# Patient Record
Sex: FEMALE | Race: WHITE | NOT HISPANIC OR LATINO | Employment: OTHER | URBAN - NONMETROPOLITAN AREA
[De-identification: names, ages, dates, MRNs, and addresses within clinical notes are randomized per-mention and may not be internally consistent; named-entity substitution may affect disease eponyms.]

---

## 2017-01-03 ENCOUNTER — APPOINTMENT (OUTPATIENT)
Dept: NUCLEAR MEDICINE | Facility: HOSPITAL | Age: 67
End: 2017-01-03
Attending: INTERNAL MEDICINE

## 2017-01-03 ENCOUNTER — APPOINTMENT (OUTPATIENT)
Dept: CARDIOLOGY | Facility: HOSPITAL | Age: 67
End: 2017-01-03
Attending: INTERNAL MEDICINE

## 2017-01-09 ENCOUNTER — OFFICE VISIT (OUTPATIENT)
Dept: CARDIOLOGY | Facility: CLINIC | Age: 67
End: 2017-01-09

## 2017-01-09 ENCOUNTER — HOSPITAL ENCOUNTER (OUTPATIENT)
Dept: CARDIOLOGY | Facility: HOSPITAL | Age: 67
Discharge: HOME | End: 2017-01-09
Attending: INTERNAL MEDICINE

## 2017-01-09 ENCOUNTER — HOSPITAL ENCOUNTER (OUTPATIENT)
Dept: NUCLEAR MEDICINE | Facility: HOSPITAL | Age: 67
Discharge: HOME | End: 2017-01-09
Attending: INTERNAL MEDICINE

## 2017-01-09 VITALS
WEIGHT: 182.4 LBS | OXYGEN SATURATION: 99 % | BODY MASS INDEX: 33.57 KG/M2 | DIASTOLIC BLOOD PRESSURE: 72 MMHG | SYSTOLIC BLOOD PRESSURE: 112 MMHG | HEIGHT: 62 IN | HEART RATE: 71 BPM

## 2017-01-09 VITALS — HEIGHT: 62 IN | BODY MASS INDEX: 31.28 KG/M2 | WEIGHT: 170 LBS

## 2017-01-09 DIAGNOSIS — I10 ESSENTIAL HYPERTENSION: ICD-10-CM

## 2017-01-09 DIAGNOSIS — E78.2 MIXED HYPERLIPIDEMIA: ICD-10-CM

## 2017-01-09 DIAGNOSIS — E03.9 ACQUIRED HYPOTHYROIDISM: ICD-10-CM

## 2017-01-09 DIAGNOSIS — I38 VALVULAR HEART DISEASE: Primary | ICD-10-CM

## 2017-01-09 DIAGNOSIS — R07.9 CHEST PAIN IN ADULT: ICD-10-CM

## 2017-01-09 DIAGNOSIS — I35.1 MILD AORTIC INSUFFICIENCY: ICD-10-CM

## 2017-01-09 PROCEDURE — 78452 HT MUSCLE IMAGE SPECT MULT: CPT | Performed by: INTERNAL MEDICINE

## 2017-01-09 PROCEDURE — 93306 TTE W/DOPPLER COMPLETE: CPT | Performed by: INTERNAL MEDICINE

## 2017-01-09 PROCEDURE — 25010000002 REGADENOSON 0.4 MG/5ML SOLUTION: Performed by: INTERNAL MEDICINE

## 2017-01-09 PROCEDURE — 25010000002 AMINOPHYLLINE PER 250 MG: Performed by: INTERNAL MEDICINE

## 2017-01-09 PROCEDURE — 0 TECHNETIUM SESTAMIBI: Performed by: INTERNAL MEDICINE

## 2017-01-09 PROCEDURE — 99214 OFFICE O/P EST MOD 30 MIN: CPT | Performed by: INTERNAL MEDICINE

## 2017-01-09 PROCEDURE — 93306 TTE W/DOPPLER COMPLETE: CPT

## 2017-01-09 PROCEDURE — A9500 TC99M SESTAMIBI: HCPCS | Performed by: INTERNAL MEDICINE

## 2017-01-09 PROCEDURE — 93018 CV STRESS TEST I&R ONLY: CPT | Performed by: INTERNAL MEDICINE

## 2017-01-09 PROCEDURE — 78452 HT MUSCLE IMAGE SPECT MULT: CPT

## 2017-01-09 PROCEDURE — 93017 CV STRESS TEST TRACING ONLY: CPT

## 2017-01-09 RX ORDER — ATENOLOL 25 MG/1
25 TABLET ORAL DAILY
Qty: 90 TABLET | Refills: 0 | Status: CANCELLED | OUTPATIENT
Start: 2017-01-09

## 2017-01-09 RX ORDER — ATENOLOL 25 MG/1
25 TABLET ORAL DAILY PRN
COMMUNITY
End: 2017-01-09 | Stop reason: ALTCHOICE

## 2017-01-09 RX ORDER — AMINOPHYLLINE DIHYDRATE 25 MG/ML
125 INJECTION, SOLUTION INTRAVENOUS ONCE
Status: COMPLETED | OUTPATIENT
Start: 2017-01-09 | End: 2017-01-09

## 2017-01-09 RX ADMIN — Medication 1 DOSE: at 10:16

## 2017-01-09 RX ADMIN — REGADENOSON 0.4 MG: 0.08 INJECTION, SOLUTION INTRAVENOUS at 11:02

## 2017-01-09 RX ADMIN — AMINOPHYLLINE 125 MG: 25 INJECTION, SOLUTION INTRAVENOUS at 11:08

## 2017-01-09 RX ADMIN — Medication 1 DOSE: at 12:33

## 2017-01-09 NOTE — MR AVS SNAPSHOT
Meera MARINA Hull   1/9/2017 4:15 PM   Office Visit    Dept Phone:  [phone]   Encounter #:  [id number]    Provider:  Karel Vizcarra MD   Department:  [facility]                Your Full Care Plan              Today's Medication Changes          These changes are accurate as of: 1/9/17  4:47 PM.  If you have any questions, ask your nurse or doctor.               Stop taking medication(s)listed here:     atenolol 25 MG tablet   Commonly known as:  TENORMIN   Stopped by:  Karel Vizcarra MD                      Your Updated Medication List          This list is accurate as of: 1/9/17  4:47 PM.  Always use your most recent med list.                aspirin 81 MG tablet       atorvastatin 40 MG tablet   Commonly known as:  LIPITOR   Take 1 tablet by mouth daily.       Calcium Carbonate-Vitamin D 600-125 MG-UNIT tablet       CELEXA 40 MG tablet   Generic drug:  citalopram       CO Q-10 PO       ferrous sulfate 325 (65 FE) MG tablet       levothyroxine 50 MCG tablet   Commonly known as:  SYNTHROID, LEVOTHROID       metFORMIN 500 MG tablet   Commonly known as:  GLUCOPHAGE       MULTI-VITAMIN PO       topiramate 100 MG tablet   Commonly known as:  TOPAMAX       vitamin b complex capsule capsule       ZEGERID PO               Instructions     None    Patient Instructions History      Upcoming Appointments     Visit Type Date Time Department    FOLLOW UP 1/9/2017  4:15 PM [facility]    [facility] ECHO 2D COMPLETE VT 1/9/2017  7:15 AM  COR NONINVASIVE LAB     [facility] HOSP NM INJ VT 1/9/2017  7:00 AM [facility] NUC MED    [facility] NM SCAN VT 1/9/2017  8:30 AM [facility] NUC MED    [facility] NM STRESS VT 1/9/2017  9:30 AM  COR STRESS TESTS    [facility] NM SCAN VT 1/9/2017 11:30 AM [facility] NUC MED    FOLLOW UP 4/3/2017  1:00 PM [facility]      Lorrie Signup     Our records indicate that you have declined [facility]  "MyChart signup. If you would like to sign up for [employer], please email [email or web address] or call [phone] to obtain an activation code.             Other Info from Your Visit           Your Appointments     Apr 03, 2017  1:00 PM EDT   Follow Up with Karel Vizcarra MD   [facility] CARDIOLOGY (--)    [address] [place] [address] [place] [address] [phone]           Arrive 15 minutes prior to appointment.              Allergies     Iodinated Diagnostic Agents      Sulfa Antibiotics        Reason for Visit     Palpitations     Hypotension     Rapid Heart Rate           Vital Signs     Blood Pressure Pulse Height Weight Oxygen Saturation Body Mass Index    112/72 (BP Location: Left arm, Patient Position: Sitting) 71 62\" (157.5 cm) 182 lb 6.4 oz (82.7 kg) 99% 33.36 kg/m2    Smoking Status                   Former Smoker             "

## 2017-01-09 NOTE — LETTER
January 11, 2017     MARCY Hoskins  [address]  [place] [address]    Patient: Meera Hull   YOB: 1950   Date of Visit: 1/9/2017       Dear MARCY Pinzon:    Thank you for referring Meera Hull to me for evaluation. Below are the relevant portions of my assessment and plan of care.    If you have questions, please do not hesitate to call me. I look forward to following Meera along with you.         Sincerely,        Karel Vizcarra MD        CC: No Recipients  Karel Vizcarra MD  1/11/2017  1:02 PM  Signed  subjective     Chief Complaint   Patient presents with   • Palpitations   • Hypotension   • Rapid Heart Rate     History of Present Illness  Patient complains of fluctuating blood pressure with periods of tachycardia.  Heartbeat goes up to 120 and then goes down to 40 minute.  Patient has not been able to tolerate medications regularly.  Sometimes blood pressure goes high up to 160 and sometimes is as low as 80 systolic.  Patient had an event monitor, echo and stress test done recently.  She is here for results and further suggestion about the therapy.  This morning during Lexiscan stress test her blood pressure was around 84 systolic prior to a stress test.    Hyperlipidemia  Meera Hull has long-standing history of hyperlipidemia. Has been trying to lose weight and trying to follow diet and activity recommandations. Patient is tolerating medications very well. There has been no side effects. Latest lipid levels have been fluctuating.  LDL is high at 119.    Patient Active Problem List   Diagnosis   • Hypertension , labile*   • Hyperlipidemia*   • Hypothyroidism*   • Obesity*   • Chest pain in adult   • Valvular heart disease ,moderate aortic and mitral regurgitation       Social History   Substance Use Topics   • Smoking status: Former Smoker     Types: Cigarettes     Quit date: 11/2012   • Smokeless tobacco: Never Used   • Alcohol use No       Allergies    "  Allergen Reactions   • Iodinated Diagnostic Agents    • Sulfa Antibiotics          Current Outpatient Prescriptions:   •  aspirin 81 MG tablet, Take 1 tablet by mouth daily., Disp: , Rfl:   •  atorvastatin (LIPITOR) 40 MG tablet, Take 1 tablet by mouth Daily., Disp: 90 tablet, Rfl: 0  •  B Complex Vitamins (VITAMIN B COMPLEX) capsule capsule, Take 1 capsule by mouth daily., Disp: , Rfl:   •  Calcium Carbonate-Vitamin D 600-125 MG-UNIT tablet, Take 1 tablet by mouth 2 (two) times a day., Disp: , Rfl:   •  citalopram (CELEXA) 40 MG tablet, Take 40 mg by mouth daily. Take 1/2 to 1 tablet, Disp: , Rfl:   •  Coenzyme Q10 (CO Q-10 PO), Take 1 tablet by mouth daily., Disp: , Rfl:   •  ferrous sulfate 325 (65 FE) MG tablet, Take 325 mg by mouth daily., Disp: , Rfl:   •  levothyroxine (SYNTHROID, LEVOTHROID) 50 MCG tablet, Take 50 mcg by mouth daily., Disp: , Rfl:   •  metFORMIN (GLUCOPHAGE) 500 MG tablet, Take 2 tablets by mouth as needed. Before meals, Disp: , Rfl:   •  Multiple Vitamin (MULTI-VITAMIN PO), Take  by mouth daily., Disp: , Rfl:   •  Omeprazole-Sodium Bicarbonate (ZEGERID PO), Take  by mouth daily., Disp: , Rfl:   •  topiramate (TOPAMAX) 100 MG tablet, Take 100 mg by mouth 2 (two) times a day., Disp: , Rfl:       The following portions of the patient's history were reviewed and updated as appropriate: allergies, current medications, past family history, past medical history, past social history, past surgical history and problem list.    Review of Systems   Constitution: Positive for weakness and malaise/fatigue.   HENT: Negative.    Eyes: Negative.    Cardiovascular: Positive for near-syncope. Negative for chest pain.   Respiratory: Negative.    Hematologic/Lymphatic: Negative.    Musculoskeletal: Negative.    Gastrointestinal: Negative.    Neurological: Positive for dizziness.          Objective:     Visit Vitals   • /72 (BP Location: Left arm, Patient Position: Sitting)   • Pulse 71   • Ht 62\" " (157.5 cm)   • Wt 182 lb 6.4 oz (82.7 kg)   • SpO2 99%   • BMI 33.36 kg/m2     Physical Exam   Constitutional: She appears well-developed and well-nourished.   HENT:   Head: Normocephalic and atraumatic.   Mouth/Throat: Oropharynx is clear and moist.   Eyes: Conjunctivae and EOM are normal. Pupils are equal, round, and reactive to light. No scleral icterus.   Neck: Normal range of motion. Neck supple. No JVD present. No tracheal deviation present. No thyromegaly present.   Cardiovascular: Normal rate, regular rhythm, normal heart sounds and intact distal pulses.  Exam reveals no friction rub.    No murmur heard.  Pulmonary/Chest: Effort normal and breath sounds normal. No respiratory distress. She has no wheezes. She has no rales. She exhibits no tenderness.   Abdominal: Soft. Bowel sounds are normal. She exhibits no distension and no mass. There is no tenderness. There is no rebound and no guarding.   Musculoskeletal: Normal range of motion. She exhibits no edema, tenderness or deformity.   Lymphadenopathy:     She has no cervical adenopathy.   Neurological: She is alert. She has normal reflexes. No cranial nerve deficit. She exhibits normal muscle tone. Coordination normal.   Skin: Skin is warm and dry.   Psychiatric: She has a normal mood and affect. Her behavior is normal. Judgment and thought content normal.         Lab Review  Lab Results   Component Value Date     08/29/2016    K 4.2 08/29/2016     08/29/2016    BUN 13 08/29/2016    CREATININE 0.83 08/29/2016    GLUCOSE 94 08/29/2016    CALCIUM 9.5 08/29/2016    ALT 14 08/29/2016    ALKPHOS 72 08/29/2016    LABIL2 1.2 (L) 08/29/2016     Lab Results   Component Value Date    CKTOTAL 70 08/29/2016     Lab Results   Component Value Date    WBC 8.91 08/29/2016    HGB 12.0 08/29/2016    HCT 38.3 08/29/2016     08/29/2016     No results found for: INR  No results found for: MG  Lab Results   Component Value Date    TSH 2.687 08/29/2016     Lab  Results   Component Value Date    BNP 60 01/09/2015     Lab Results   Component Value Date    CHLPL 145 08/10/2015     Lab Results   Component Value Date    CHOL 184 08/29/2016    TRIG 81 08/29/2016    HDL 49 (L) 08/29/2016    LDLCALC 119 (H) 08/29/2016    VLDL 16.2 08/29/2016    LDLHDL 2.42 08/29/2016         Procedures   Interpretation Summary stress test January 9, 2017  · Left ventricular ejection fraction is hyperdynamic (Calculated EF > 70%).  · Myocardial perfusion imaging indicates a normal myocardial perfusion study with no evidence of ischemia.  · Impressions are consistent with a low risk study.  · Findings consistent with a normal ECG stress test.         Interpretation Summary echo January 9, 2017  · All left ventricular wall segments contract normally.  · Left ventricular function is normal. Estimated EF = 60%.  · Moderate aortic valve regurgitation is present.  · Moderate mitral valve regurgitation is present  · Trace to mild tricuspid valve regurgitation is present. Estimated right ventricular systolic pressure from tricuspid regurgitation is normal (<35 mmHg).  · There is no evidence of pericardial effusion     Event monitor  Showed periods of sinus tachycardia 40/m and sinus tachycardia up 220/m no pause no significant arrhythmia    I personally viewed and interpreted the patient's LAB data         Assessment:      Diagnosis Plan   1. Valvular heart disease ,moderate aortic and mitral regurgitation     2. Essential hypertension     3. Acquired hypothyroidism     4. Mixed hyperlipidemia            Plan:     Patient's chest pain is atypical and probably noncardiac  She does have moderate mitral and tricuspid regurgitation however her LV functions are normal no change in therapy is needed.    Patient's heart rate and blood pressure are quite labile.  She was advised not to take beta blocker therapy because that is causing significant bradycardia.  As long as her blood pressure is 140 systolic or below  she will not take any medications.  She might take lisinopril 2.5-5 mg only on when necessary basis    Patient will take Lipitor for hyperlipidemia and will check lab work next visit.  She will continue current dose of thyroid as per PCP Della Francisco      Return in about 3 months (around 4/9/2017).

## 2017-01-09 NOTE — PROGRESS NOTES
subjective     Chief Complaint   Patient presents with   • Palpitations   • Hypotension   • Rapid Heart Rate     History of Present Illness  Patient complains of fluctuating blood pressure with periods of tachycardia.  Heartbeat goes up to 120 and then goes down to 40 minute.  Patient has not been able to tolerate medications regularly.  Sometimes blood pressure goes high up to 160 and sometimes is as low as 80 systolic.  Patient had an event monitor, echo and stress test done recently.  She is here for results and further suggestion about the therapy.  This morning during Lexiscan stress test her blood pressure was around 84 systolic prior to a stress test.    Hyperlipidemia  Meera Hull has long-standing history of hyperlipidemia. Has been trying to lose weight and trying to follow diet and activity recommandations. Patient is tolerating medications very well. There has been no side effects. Latest lipid levels have been fluctuating.  LDL is high at 119.    Patient Active Problem List   Diagnosis   • Hypertension , labile*   • Hyperlipidemia*   • Hypothyroidism*   • Obesity*   • Chest pain in adult   • Valvular heart disease ,moderate aortic and mitral regurgitation       Social History   Substance Use Topics   • Smoking status: Former Smoker     Types: Cigarettes     Quit date: 11/2012   • Smokeless tobacco: Never Used   • Alcohol use No       Allergies   Allergen Reactions   • Iodinated Diagnostic Agents    • Sulfa Antibiotics          Current Outpatient Prescriptions:   •  aspirin 81 MG tablet, Take 1 tablet by mouth daily., Disp: , Rfl:   •  atorvastatin (LIPITOR) 40 MG tablet, Take 1 tablet by mouth Daily., Disp: 90 tablet, Rfl: 0  •  B Complex Vitamins (VITAMIN B COMPLEX) capsule capsule, Take 1 capsule by mouth daily., Disp: , Rfl:   •  Calcium Carbonate-Vitamin D 600-125 MG-UNIT tablet, Take 1 tablet by mouth 2 (two) times a day., Disp: , Rfl:   •  citalopram (CELEXA) 40 MG tablet, Take 40 mg by mouth  "daily. Take 1/2 to 1 tablet, Disp: , Rfl:   •  Coenzyme Q10 (CO Q-10 PO), Take 1 tablet by mouth daily., Disp: , Rfl:   •  ferrous sulfate 325 (65 FE) MG tablet, Take 325 mg by mouth daily., Disp: , Rfl:   •  levothyroxine (SYNTHROID, LEVOTHROID) 50 MCG tablet, Take 50 mcg by mouth daily., Disp: , Rfl:   •  metFORMIN (GLUCOPHAGE) 500 MG tablet, Take 2 tablets by mouth as needed. Before meals, Disp: , Rfl:   •  Multiple Vitamin (MULTI-VITAMIN PO), Take  by mouth daily., Disp: , Rfl:   •  Omeprazole-Sodium Bicarbonate (ZEGERID PO), Take  by mouth daily., Disp: , Rfl:   •  topiramate (TOPAMAX) 100 MG tablet, Take 100 mg by mouth 2 (two) times a day., Disp: , Rfl:       The following portions of the patient's history were reviewed and updated as appropriate: allergies, current medications, past family history, past medical history, past social history, past surgical history and problem list.    Review of Systems   Constitution: Positive for weakness and malaise/fatigue.   HENT: Negative.    Eyes: Negative.    Cardiovascular: Positive for near-syncope. Negative for chest pain.   Respiratory: Negative.    Hematologic/Lymphatic: Negative.    Musculoskeletal: Negative.    Gastrointestinal: Negative.    Neurological: Positive for dizziness.          Objective:     Visit Vitals   • /72 (BP Location: Left arm, Patient Position: Sitting)   • Pulse 71   • Ht 62\" (157.5 cm)   • Wt 182 lb 6.4 oz (82.7 kg)   • SpO2 99%   • BMI 33.36 kg/m2     Physical Exam   Constitutional: She appears well-developed and well-nourished.   HENT:   Head: Normocephalic and atraumatic.   Mouth/Throat: Oropharynx is clear and moist.   Eyes: Conjunctivae and EOM are normal. Pupils are equal, round, and reactive to light. No scleral icterus.   Neck: Normal range of motion. Neck supple. No JVD present. No tracheal deviation present. No thyromegaly present.   Cardiovascular: Normal rate, regular rhythm, normal heart sounds and intact distal pulses.  " Exam reveals no friction rub.    No murmur heard.  Pulmonary/Chest: Effort normal and breath sounds normal. No respiratory distress. She has no wheezes. She has no rales. She exhibits no tenderness.   Abdominal: Soft. Bowel sounds are normal. She exhibits no distension and no mass. There is no tenderness. There is no rebound and no guarding.   Musculoskeletal: Normal range of motion. She exhibits no edema, tenderness or deformity.   Lymphadenopathy:     She has no cervical adenopathy.   Neurological: She is alert. She has normal reflexes. No cranial nerve deficit. She exhibits normal muscle tone. Coordination normal.   Skin: Skin is warm and dry.   Psychiatric: She has a normal mood and affect. Her behavior is normal. Judgment and thought content normal.         Lab Review  Lab Results   Component Value Date     08/29/2016    K 4.2 08/29/2016     08/29/2016    BUN 13 08/29/2016    CREATININE 0.83 08/29/2016    GLUCOSE 94 08/29/2016    CALCIUM 9.5 08/29/2016    ALT 14 08/29/2016    ALKPHOS 72 08/29/2016    LABIL2 1.2 (L) 08/29/2016     Lab Results   Component Value Date    CKTOTAL 70 08/29/2016     Lab Results   Component Value Date    WBC 8.91 08/29/2016    HGB 12.0 08/29/2016    HCT 38.3 08/29/2016     08/29/2016     No results found for: INR  No results found for: MG  Lab Results   Component Value Date    TSH 2.687 08/29/2016     Lab Results   Component Value Date    BNP 60 01/09/2015     Lab Results   Component Value Date    CHLPL 145 08/10/2015     Lab Results   Component Value Date    CHOL 184 08/29/2016    TRIG 81 08/29/2016    HDL 49 (L) 08/29/2016    LDLCALC 119 (H) 08/29/2016    VLDL 16.2 08/29/2016    LDLHDL 2.42 08/29/2016         Procedures   Interpretation Summary stress test January 9, 2017  · Left ventricular ejection fraction is hyperdynamic (Calculated EF > 70%).  · Myocardial perfusion imaging indicates a normal myocardial perfusion study with no evidence of  ischemia.  · Impressions are consistent with a low risk study.  · Findings consistent with a normal ECG stress test.         Interpretation Summary echo January 9, 2017  · All left ventricular wall segments contract normally.  · Left ventricular function is normal. Estimated EF = 60%.  · Moderate aortic valve regurgitation is present.  · Moderate mitral valve regurgitation is present  · Trace to mild tricuspid valve regurgitation is present. Estimated right ventricular systolic pressure from tricuspid regurgitation is normal (<35 mmHg).  · There is no evidence of pericardial effusion     Event monitor  Showed periods of sinus tachycardia 40/m and sinus tachycardia up 220/m no pause no significant arrhythmia    I personally viewed and interpreted the patient's LAB data         Assessment:      Diagnosis Plan   1. Valvular heart disease ,moderate aortic and mitral regurgitation     2. Essential hypertension     3. Acquired hypothyroidism     4. Mixed hyperlipidemia            Plan:     Patient's chest pain is atypical and probably noncardiac  She does have moderate mitral and tricuspid regurgitation however her LV functions are normal no change in therapy is needed.    Patient's heart rate and blood pressure are quite labile.  She was advised not to take beta blocker therapy because that is causing significant bradycardia.  As long as her blood pressure is 140 systolic or below she will not take any medications.  She might take lisinopril 2.5-5 mg only on when necessary basis    Patient will take Lipitor for hyperlipidemia and will check lab work next visit.  She will continue current dose of thyroid as per PCP Della Francisco      Return in about 3 months (around 4/9/2017).

## 2017-01-10 ENCOUNTER — APPOINTMENT (OUTPATIENT)
Dept: NUCLEAR MEDICINE | Facility: HOSPITAL | Age: 67
End: 2017-01-10
Attending: INTERNAL MEDICINE

## 2017-01-10 ENCOUNTER — APPOINTMENT (OUTPATIENT)
Dept: CARDIOLOGY | Facility: HOSPITAL | Age: 67
End: 2017-01-10
Attending: INTERNAL MEDICINE

## 2017-01-10 ENCOUNTER — TELEPHONE (OUTPATIENT)
Dept: CARDIOLOGY | Facility: CLINIC | Age: 67
End: 2017-01-10

## 2017-01-10 LAB
BH CV ECHO MEAS - ACS: 1.1 CM
BH CV ECHO MEAS - AI DEC SLOPE: 175.2 CM/SEC^2
BH CV ECHO MEAS - AI MAX PG: 71.2 MMHG
BH CV ECHO MEAS - AI MAX VEL: 422 CM/SEC
BH CV ECHO MEAS - AI P1/2T: 705.3 MSEC
BH CV ECHO MEAS - AO MAX PG (FULL): 6.6 MMHG
BH CV ECHO MEAS - AO MAX PG: 11.4 MMHG
BH CV ECHO MEAS - AO MEAN PG (FULL): 3.8 MMHG
BH CV ECHO MEAS - AO MEAN PG: 6.2 MMHG
BH CV ECHO MEAS - AO ROOT AREA (BSA CORRECTED): 1.6
BH CV ECHO MEAS - AO ROOT AREA: 5.3 CM^2
BH CV ECHO MEAS - AO ROOT DIAM: 2.6 CM
BH CV ECHO MEAS - AO V2 MAX: 168.6 CM/SEC
BH CV ECHO MEAS - AO V2 MEAN: 117.2 CM/SEC
BH CV ECHO MEAS - AO V2 VTI: 44.6 CM
BH CV ECHO MEAS - AVA(I,A): 1.9 CM^2
BH CV ECHO MEAS - AVA(I,D): 1.9 CM^2
BH CV ECHO MEAS - AVA(V,A): 2.1 CM^2
BH CV ECHO MEAS - AVA(V,D): 2.1 CM^2
BH CV ECHO MEAS - BSA(HAYCOCK): 1.7 M^2
BH CV ECHO MEAS - BSA: 1.6 M^2
BH CV ECHO MEAS - BZI_BMI: 25.6 KILOGRAMS/M^2
BH CV ECHO MEAS - BZI_METRIC_HEIGHT: 157.5 CM
BH CV ECHO MEAS - BZI_METRIC_WEIGHT: 63.5 KG
BH CV ECHO MEAS - CONTRAST EF 4CH: 59.7 ML/M^2
BH CV ECHO MEAS - EDV(CUBED): 205.7 ML
BH CV ECHO MEAS - EDV(MOD-SP4): 124 ML
BH CV ECHO MEAS - EDV(TEICH): 173.4 ML
BH CV ECHO MEAS - EF(CUBED): 63.6 %
BH CV ECHO MEAS - EF(MOD-SP4): 59.7 %
BH CV ECHO MEAS - EF(TEICH): 54.3 %
BH CV ECHO MEAS - ESV(CUBED): 75 ML
BH CV ECHO MEAS - ESV(MOD-SP4): 50 ML
BH CV ECHO MEAS - ESV(TEICH): 79.3 ML
BH CV ECHO MEAS - FS: 28.6 %
BH CV ECHO MEAS - IVS/LVPW: 0.59
BH CV ECHO MEAS - IVSD: 0.48 CM
BH CV ECHO MEAS - LA DIMENSION: 3.1 CM
BH CV ECHO MEAS - LA/AO: 1.2
BH CV ECHO MEAS - LV DIASTOLIC VOL/BSA (35-75): 75.5 ML/M^2
BH CV ECHO MEAS - LV MASS(C)D: 138.2 GRAMS
BH CV ECHO MEAS - LV MASS(C)DI: 84.2 GRAMS/M^2
BH CV ECHO MEAS - LV MAX PG: 4.8 MMHG
BH CV ECHO MEAS - LV MEAN PG: 2.4 MMHG
BH CV ECHO MEAS - LV SYSTOLIC VOL/BSA (12-30): 30.4 ML/M^2
BH CV ECHO MEAS - LV V1 MAX: 109.6 CM/SEC
BH CV ECHO MEAS - LV V1 MEAN: 70.7 CM/SEC
BH CV ECHO MEAS - LV V1 VTI: 26.6 CM
BH CV ECHO MEAS - LVIDD: 5.9 CM
BH CV ECHO MEAS - LVIDS: 4.2 CM
BH CV ECHO MEAS - LVLD AP4: 7.8 CM
BH CV ECHO MEAS - LVLS AP4: 6.8 CM
BH CV ECHO MEAS - LVOT AREA (M): 3.1 CM^2
BH CV ECHO MEAS - LVOT AREA: 3.2 CM^2
BH CV ECHO MEAS - LVOT DIAM: 2 CM
BH CV ECHO MEAS - LVPWD: 0.81 CM
BH CV ECHO MEAS - MV A MAX VEL: 75 CM/SEC
BH CV ECHO MEAS - MV DEC TIME: 0.26 SEC
BH CV ECHO MEAS - MV E MAX VEL: 110.1 CM/SEC
BH CV ECHO MEAS - MV E/A: 1.5
BH CV ECHO MEAS - PA ACC SLOPE: 334.9 CM/SEC^2
BH CV ECHO MEAS - PA ACC TIME: 0.19 SEC
BH CV ECHO MEAS - PA PR(ACCEL): -6.6 MMHG
BH CV ECHO MEAS - RAP SYSTOLE: 10 MMHG
BH CV ECHO MEAS - RVSP: 30.9 MMHG
BH CV ECHO MEAS - SI(AO): 144.4 ML/M^2
BH CV ECHO MEAS - SI(CUBED): 79.6 ML/M^2
BH CV ECHO MEAS - SI(LVOT): 51.6 ML/M^2
BH CV ECHO MEAS - SI(MOD-SP4): 45 ML/M^2
BH CV ECHO MEAS - SI(TEICH): 57.3 ML/M^2
BH CV ECHO MEAS - SV(AO): 237.2 ML
BH CV ECHO MEAS - SV(CUBED): 130.7 ML
BH CV ECHO MEAS - SV(LVOT): 84.7 ML
BH CV ECHO MEAS - SV(MOD-SP4): 74 ML
BH CV ECHO MEAS - SV(TEICH): 94.1 ML
BH CV ECHO MEAS - TR MAX VEL: 228.6 CM/SEC
BH CV NUCLEAR PRIOR STUDY: 3
BH CV STRESS BP STAGE 1: NORMAL
BH CV STRESS COMMENTS STAGE 1: NORMAL
BH CV STRESS DOSE REGADENOSON STAGE 1: 0.4
BH CV STRESS DURATION MIN STAGE 1: 0
BH CV STRESS DURATION SEC STAGE 1: 15
BH CV STRESS HR STAGE 1: 99
BH CV STRESS PROTOCOL 1: NORMAL
BH CV STRESS RECOVERY BP: NORMAL MMHG
BH CV STRESS RECOVERY HR: 61 BPM
BH CV STRESS STAGE 1: 1
LV EF 2D ECHO EST: 60 %
LV EF NUC BP: 76 %
MAXIMAL PREDICTED HEART RATE: 156 BPM
PERCENT MAX PREDICTED HR: 63.46 %
STRESS BASELINE BP: NORMAL MMHG
STRESS BASELINE HR: 59 BPM
STRESS PERCENT HR: 75 %
STRESS POST PEAK BP: NORMAL MMHG
STRESS POST PEAK HR: 99 BPM
STRESS TARGET HR: 133 BPM

## 2017-01-10 RX ORDER — ATORVASTATIN CALCIUM 40 MG/1
40 TABLET, FILM COATED ORAL DAILY
Qty: 90 TABLET | Refills: 0 | Status: SHIPPED | OUTPATIENT
Start: 2017-01-10 | End: 2017-04-03 | Stop reason: SDUPTHER

## 2017-01-10 NOTE — TELEPHONE ENCOUNTER
Stress test is normal   Echo shows lead EKG mitral and aortic valve.  No change.  No sign of heart failure.   No need to change the medications

## 2017-01-11 PROBLEM — I38 VALVULAR HEART DISEASE: Status: ACTIVE | Noted: 2017-01-11

## 2017-04-03 ENCOUNTER — OFFICE VISIT (OUTPATIENT)
Dept: CARDIOLOGY | Facility: CLINIC | Age: 67
End: 2017-04-03

## 2017-04-03 VITALS
BODY MASS INDEX: 33.16 KG/M2 | SYSTOLIC BLOOD PRESSURE: 102 MMHG | HEART RATE: 82 BPM | WEIGHT: 180.2 LBS | HEIGHT: 62 IN | DIASTOLIC BLOOD PRESSURE: 68 MMHG | OXYGEN SATURATION: 97 %

## 2017-04-03 DIAGNOSIS — I10 ESSENTIAL HYPERTENSION: ICD-10-CM

## 2017-04-03 DIAGNOSIS — R07.9 CHEST PAIN IN ADULT: ICD-10-CM

## 2017-04-03 DIAGNOSIS — E03.9 ACQUIRED HYPOTHYROIDISM: ICD-10-CM

## 2017-04-03 DIAGNOSIS — I38 VALVULAR HEART DISEASE: Primary | ICD-10-CM

## 2017-04-03 DIAGNOSIS — E78.2 MIXED HYPERLIPIDEMIA: ICD-10-CM

## 2017-04-03 PROCEDURE — 99213 OFFICE O/P EST LOW 20 MIN: CPT | Performed by: INTERNAL MEDICINE

## 2017-04-03 RX ORDER — ATORVASTATIN CALCIUM 40 MG/1
40 TABLET, FILM COATED ORAL DAILY
Qty: 30 TABLET | Refills: 2 | Status: SHIPPED | OUTPATIENT
Start: 2017-04-03 | End: 2019-10-14 | Stop reason: SDUPTHER

## 2017-04-03 NOTE — PROGRESS NOTES
subjective     Chief Complaint   Patient presents with   • Follow-up   • Hyperlipidemia     History of Present Illness      HYPERTENSION  Meera Hull has long-standing history of mild labile hypertension.  Patient is not requiring any medications.  According to her blood pressure lately has been doing fairly well.  She is not requiring any medicines at this time.    Hyperlipidemia  Meera Hull has long-standing history of hyperlipidemia.  Has been trying to lose weight and trying to follow diet and activity recommandations.  Patient is tolerating medications very well.  There has been no side effects.    Patient is taking Lipitor 40 mg daily.  She will try to lose weight and continue current medications    Valvular heart disease  Patient has moderate aortic and mitral regurgitation but is asymptomatic at this time.  LV functions are normal to nearly there is no evidence of heart failure.    Her other problems consist of hypothyroidism and obesity and fatigue    Patient Active Problem List   Diagnosis   • Hypertension , labile*   • Hyperlipidemia*   • Hypothyroidism*   • Obesity*   • Chest pain in adult   • Valvular heart disease ,moderate aortic and mitral regurgitation       Social History   Substance Use Topics   • Smoking status: Former Smoker     Types: Cigarettes     Quit date: 11/2012   • Smokeless tobacco: Never Used   • Alcohol use No       Allergies   Allergen Reactions   • Iodinated Diagnostic Agents    • Sulfa Antibiotics          Current Outpatient Prescriptions:   •  aspirin 81 MG tablet, Take 1 tablet by mouth daily., Disp: , Rfl:   •  atorvastatin (LIPITOR) 40 MG tablet, Take 1 tablet by mouth Daily., Disp: 30 tablet, Rfl: 2  •  B Complex Vitamins (VITAMIN B COMPLEX) capsule capsule, Take 1 capsule by mouth daily., Disp: , Rfl:   •  Calcium Carbonate-Vitamin D 600-125 MG-UNIT tablet, Take 1 tablet by mouth 2 (two) times a day., Disp: , Rfl:   •  citalopram (CELEXA) 40 MG tablet, Take 40 mg by  "mouth daily. Take 1/2 to 1 tablet, Disp: , Rfl:   •  Coenzyme Q10 (CO Q-10 PO), Take 1 tablet by mouth daily., Disp: , Rfl:   •  ferrous sulfate 325 (65 FE) MG tablet, Take 325 mg by mouth daily., Disp: , Rfl:   •  levothyroxine (SYNTHROID, LEVOTHROID) 50 MCG tablet, Take 50 mcg by mouth daily., Disp: , Rfl:   •  metFORMIN (GLUCOPHAGE) 500 MG tablet, Take 2 tablets by mouth as needed. Before meals, Disp: , Rfl:   •  Multiple Vitamin (MULTI-VITAMIN PO), Take  by mouth daily., Disp: , Rfl:   •  Omeprazole-Sodium Bicarbonate (ZEGERID PO), Take  by mouth daily., Disp: , Rfl:   •  topiramate (TOPAMAX) 100 MG tablet, Take 100 mg by mouth 2 (two) times a day., Disp: , Rfl:       The following portions of the patient's history were reviewed and updated as appropriate: allergies, current medications, past family history, past medical history, past social history, past surgical history and problem list.    Review of Systems   Constitution: Negative.   HENT: Negative.    Eyes: Negative.    Cardiovascular: Negative.    Respiratory: Negative.    Hematologic/Lymphatic: Negative.    Musculoskeletal: Negative.    Gastrointestinal: Negative.    Neurological: Negative.           Objective:     /68 (BP Location: Left arm, Patient Position: Sitting)  Pulse 82  Ht 62\" (157.5 cm)  Wt 180 lb 3.2 oz (81.7 kg)  SpO2 97%  BMI 32.96 kg/m2  Physical Exam   Constitutional: She appears well-developed and well-nourished.   HENT:   Head: Normocephalic and atraumatic.   Mouth/Throat: Oropharynx is clear and moist.   Eyes: Conjunctivae and EOM are normal. Pupils are equal, round, and reactive to light. No scleral icterus.   Neck: Normal range of motion. Neck supple. No JVD present. No tracheal deviation present. No thyromegaly present.   Cardiovascular: Normal rate, regular rhythm, normal heart sounds and intact distal pulses.  Exam reveals no friction rub.    No murmur heard.  Pulmonary/Chest: Effort normal and breath sounds normal. No " respiratory distress. She has no wheezes. She has no rales. She exhibits no tenderness.   Abdominal: Soft. Bowel sounds are normal. She exhibits no distension and no mass. There is no tenderness. There is no rebound and no guarding.   Musculoskeletal: Normal range of motion. She exhibits no edema, tenderness or deformity.   Lymphadenopathy:     She has no cervical adenopathy.   Neurological: She is alert. She has normal reflexes. No cranial nerve deficit. She exhibits normal muscle tone. Coordination normal.   Skin: Skin is warm and dry.   Psychiatric: She has a normal mood and affect. Her behavior is normal. Judgment and thought content normal.         Lab Review  Lab from [facility] through lab core 3/17/2017    hemoglobin A1c 5.9,   total cholesterol 182,   HDL 60,   ,   triglyceride 88   CBC and comprehensive metabolic panel and TSH  Normal.    Procedures     I personally viewed and interpreted the patient's LAB data         Assessment:     1. Valvular heart disease ,moderate aortic and mitral regurgitation    2. Essential hypertension    3. Mixed hyperlipidemia    4. Acquired hypothyroidism    5. Chest pain in adult          Plan:      patient has moderate aortic and mitral regurgitation.  There is no evidence of heart failure patient is asymptomatic  Her blood pressure has been fairly well controlled without medications.  She is trying to avoid salt    Lipids are better she will continue Lipitor 40 mg daily LDL is still mildly elevated.  No change in therapy was made from cardiac standpoint          Return in about 3 months (around 7/3/2017).

## 2017-07-28 ENCOUNTER — OFFICE VISIT (OUTPATIENT)
Dept: CARDIOLOGY | Facility: CLINIC | Age: 67
End: 2017-07-28

## 2017-07-28 VITALS
SYSTOLIC BLOOD PRESSURE: 102 MMHG | DIASTOLIC BLOOD PRESSURE: 68 MMHG | HEART RATE: 70 BPM | WEIGHT: 184.4 LBS | OXYGEN SATURATION: 98 % | HEIGHT: 62 IN | BODY MASS INDEX: 33.93 KG/M2

## 2017-07-28 DIAGNOSIS — R00.2 PALPITATION: Primary | ICD-10-CM

## 2017-07-28 DIAGNOSIS — I38 VALVULAR HEART DISEASE: ICD-10-CM

## 2017-07-28 DIAGNOSIS — E78.2 MIXED HYPERLIPIDEMIA: ICD-10-CM

## 2017-07-28 PROCEDURE — 99213 OFFICE O/P EST LOW 20 MIN: CPT | Performed by: INTERNAL MEDICINE

## 2017-12-12 ENCOUNTER — TRANSCRIBE ORDERS (OUTPATIENT)
Dept: ADMINISTRATIVE | Facility: HOSPITAL | Age: 67
End: 2017-12-12

## 2017-12-12 DIAGNOSIS — Z12.31 VISIT FOR SCREENING MAMMOGRAM: Primary | ICD-10-CM

## 2018-01-12 ENCOUNTER — HOSPITAL ENCOUNTER (OUTPATIENT)
Dept: MAMMOGRAPHY | Facility: HOSPITAL | Age: 68
Discharge: HOME | End: 2018-01-12
Admitting: NURSE PRACTITIONER

## 2018-01-12 DIAGNOSIS — Z12.31 VISIT FOR SCREENING MAMMOGRAM: ICD-10-CM

## 2018-01-12 PROCEDURE — 77067 SCR MAMMO BI INCL CAD: CPT

## 2018-01-12 PROCEDURE — 77063 BREAST TOMOSYNTHESIS BI: CPT | Performed by: RADIOLOGY

## 2018-01-12 PROCEDURE — 77063 BREAST TOMOSYNTHESIS BI: CPT

## 2018-01-12 PROCEDURE — 77067 SCR MAMMO BI INCL CAD: CPT | Performed by: RADIOLOGY

## 2018-01-25 ENCOUNTER — OFFICE VISIT (OUTPATIENT)
Dept: CARDIOLOGY | Facility: CLINIC | Age: 68
End: 2018-01-25

## 2018-01-25 VITALS
WEIGHT: 193 LBS | SYSTOLIC BLOOD PRESSURE: 100 MMHG | DIASTOLIC BLOOD PRESSURE: 60 MMHG | OXYGEN SATURATION: 97 % | TEMPERATURE: 97.7 F | BODY MASS INDEX: 35.51 KG/M2 | HEIGHT: 62 IN | HEART RATE: 74 BPM

## 2018-01-25 DIAGNOSIS — I10 ESSENTIAL HYPERTENSION: ICD-10-CM

## 2018-01-25 DIAGNOSIS — I38 VALVULAR HEART DISEASE: ICD-10-CM

## 2018-01-25 DIAGNOSIS — E78.2 MIXED HYPERLIPIDEMIA: Primary | ICD-10-CM

## 2018-01-25 PROCEDURE — 99213 OFFICE O/P EST LOW 20 MIN: CPT | Performed by: INTERNAL MEDICINE

## 2018-01-25 RX ORDER — ATORVASTATIN CALCIUM 40 MG/1
40 TABLET, FILM COATED ORAL DAILY
Qty: 90 TABLET | Refills: 0 | Status: CANCELLED | OUTPATIENT
Start: 2018-01-25

## 2018-01-25 RX ORDER — IPRATROPIUM BROMIDE AND ALBUTEROL SULFATE 2.5; .5 MG/3ML; MG/3ML
3 SOLUTION RESPIRATORY (INHALATION) EVERY 4 HOURS PRN
COMMUNITY

## 2018-01-25 NOTE — PROGRESS NOTES
subjective     Chief Complaint   Patient presents with   • Follow-up   • Palpitations   • valvular heart disease     History of Present Illness    Patient is 67 years old white female who is here for cardiology follow-up.  Patient has history of hypertension, hyperlipidemia and the moderate aortic and mitral regurgitation.  She also has history of palpitations.   Patient is doing very well.  She is taking Lipitor 40 mg daily.  There are no drug side effects.  Labile hypertension is also very well controlled.  She is actually not taking any blood pressure medicine at this time.    Lately she has had no palpitations.  She is doing very well and is asymptomatic.  Patient has moderate aortic and mitral regurgitation but no clinical evidence of heart failure.  She is very well compensated      Past Surgical History:   Procedure Laterality Date   • BREAST BIOPSY Right    • COLONOSCOPY W/ BIOPSIES  09/2014    Dr. Valenzuela-benign colonic mucosa w/ no diagnostic histopathologic abnormalities   • HYSTERECTOMY      partial   • TONSILLECTOMY       Family History   Problem Relation Age of Onset   • Heart attack Father    • Stroke Father    • Heart attack Other    • Cancer Mother    • Cancer Sister    • Cancer Sister    • Heart attack Sister    • Hypertension Sister    • Heart block Sister    • Aneurysm Sister    • Breast cancer Neg Hx      Past Medical History:   Diagnosis Date   • History of EKG 01/23/2015    normal except for rate   • Hyperlipidemia    • Hypertension    • Hypothyroidism    • Mild aortic insufficiency    • Obesity      Patient Active Problem List   Diagnosis   • Hypertension , labile*   • Hyperlipidemia*   • Hypothyroidism*   • Obesity*   • Chest pain in adult   • Valvular heart disease ,moderate aortic and mitral regurgitation   • Palpitation   • B12 deficiency       Social History   Substance Use Topics   • Smoking status: Former Smoker     Types: Cigarettes     Quit date: 11/2012   • Smokeless tobacco: Never  Used   • Alcohol use No       Allergies   Allergen Reactions   • Iodinated Diagnostic Agents    • Sulfa Antibiotics        Current Outpatient Prescriptions on File Prior to Visit   Medication Sig   • aspirin 81 MG tablet Take 1 tablet by mouth daily.   • atorvastatin (LIPITOR) 40 MG tablet Take 1 tablet by mouth Daily.   • B Complex Vitamins (VITAMIN B COMPLEX) capsule capsule Take 1 capsule by mouth daily.   • Calcium Carbonate-Vitamin D 600-125 MG-UNIT tablet Take 1 tablet by mouth 2 (two) times a day.   • citalopram (CELEXA) 40 MG tablet Take 40 mg by mouth daily. Take 1/2 to 1 tablet   • Coenzyme Q10 (CO Q-10 PO) Take 1 tablet by mouth daily.   • ferrous sulfate 325 (65 FE) MG tablet Take 325 mg by mouth daily.   • levothyroxine (SYNTHROID, LEVOTHROID) 50 MCG tablet Take 50 mcg by mouth daily.   • metFORMIN (GLUCOPHAGE) 500 MG tablet Take 2 tablets by mouth as needed. Before meals   • Multiple Vitamin (MULTI-VITAMIN PO) Take  by mouth daily.   • Omeprazole-Sodium Bicarbonate (ZEGERID PO) Take  by mouth daily.   • topiramate (TOPAMAX) 100 MG tablet Take 100 mg by mouth 2 (two) times a day.     No current facility-administered medications on file prior to visit.          The following portions of the patient's history were reviewed and updated as appropriate: allergies, current medications, past family history, past medical history, past social history, past surgical history and problem list.    Review of Systems   Constitution: Negative.   HENT: Negative.  Negative for congestion.    Eyes: Negative.    Cardiovascular: Negative.  Negative for chest pain, cyanosis, dyspnea on exertion, irregular heartbeat, leg swelling, near-syncope, orthopnea, palpitations, paroxysmal nocturnal dyspnea and syncope.   Respiratory: Negative.  Negative for shortness of breath.    Hematologic/Lymphatic: Negative.    Musculoskeletal: Negative.    Gastrointestinal: Negative.    Neurological: Negative.  Negative for headaches.         "  Objective:     /60  Pulse 74  Temp 97.7 °F (36.5 °C) (Tympanic)   Ht 157.5 cm (62\")  Wt 87.5 kg (193 lb)  SpO2 97%  BMI 35.3 kg/m2  Physical Exam   Constitutional: She appears well-developed and well-nourished. No distress.   HENT:   Head: Normocephalic and atraumatic.   Mouth/Throat: Oropharynx is clear and moist. No oropharyngeal exudate.   Eyes: Conjunctivae and EOM are normal. Pupils are equal, round, and reactive to light. No scleral icterus.   Neck: Normal range of motion. Neck supple. No JVD present. No tracheal deviation present. No thyromegaly present.   Cardiovascular: Normal rate, regular rhythm and intact distal pulses.  PMI is not displaced.  Exam reveals no gallop, no friction rub and no decreased pulses.    Murmur heard.  Pulses:       Carotid pulses are 3+ on the right side, and 3+ on the left side.       Radial pulses are 3+ on the right side, and 3+ on the left side.   Pulmonary/Chest: Effort normal and breath sounds normal. No respiratory distress. She has no wheezes. She has no rales. She exhibits no tenderness.   Abdominal: Soft. Bowel sounds are normal. She exhibits no distension, no abdominal bruit and no mass. There is no splenomegaly or hepatomegaly. There is no tenderness. There is no rebound and no guarding.   Musculoskeletal: Normal range of motion. She exhibits no edema, tenderness or deformity.   Lymphadenopathy:     She has no cervical adenopathy.   Neurological: She is alert. She has normal reflexes. No cranial nerve deficit. She exhibits normal muscle tone. Coordination normal.   Skin: Skin is warm and dry. No rash noted. She is not diaphoretic. No erythema.   Psychiatric: She has a normal mood and affect. Her behavior is normal. Judgment and thought content normal.         Lab Review  Last lab work 4 months ago showed a cholesterol of 178 and .  Lab work will be scheduled for next visit.    Procedures       I personally viewed and interpreted the patient's LAB " data         Assessment:     1. Mixed hyperlipidemia    2. Essential hypertension    3. Valvular heart disease ,moderate aortic and mitral regurgitation          Plan:      Patient sees be doing very well.  She is tolerating Lipitor 40 mg daily which will be continued.  She also takes aspirin one a day.  Blood pressure is very well controlled without any medications.  Lately she has had no spells of high blood pressure.  Her other problems consist of hypothyroidism and her diabetes mellitus which is controlled.  From cardiac standpoint she is doing very well.  Clinically valvular status is unchanged.  There is no evidence of heart failure.  Follow-up scheduled.        No Follow-up on file.

## 2018-03-06 ENCOUNTER — HOSPITAL ENCOUNTER (OUTPATIENT)
Dept: MAMMOGRAPHY | Facility: HOSPITAL | Age: 68
Discharge: HOME | End: 2018-03-06
Attending: RADIOLOGY | Admitting: RADIOLOGY

## 2018-03-06 ENCOUNTER — HOSPITAL ENCOUNTER (OUTPATIENT)
Dept: ULTRASOUND IMAGING | Facility: HOSPITAL | Age: 68
Discharge: HOME | End: 2018-03-06
Attending: RADIOLOGY

## 2018-03-06 DIAGNOSIS — R92.8 ABNORMAL MAMMOGRAM OF LEFT BREAST: ICD-10-CM

## 2018-03-06 PROCEDURE — 77061 BREAST TOMOSYNTHESIS UNI: CPT | Performed by: RADIOLOGY

## 2018-03-06 PROCEDURE — 77065 DX MAMMO INCL CAD UNI: CPT

## 2018-03-06 PROCEDURE — 77065 DX MAMMO INCL CAD UNI: CPT | Performed by: RADIOLOGY

## 2018-03-06 PROCEDURE — G0279 TOMOSYNTHESIS, MAMMO: HCPCS

## 2018-09-19 ENCOUNTER — OFFICE VISIT (OUTPATIENT)
Dept: CARDIOLOGY | Facility: CLINIC | Age: 68
End: 2018-09-19

## 2018-09-19 VITALS
SYSTOLIC BLOOD PRESSURE: 108 MMHG | WEIGHT: 186 LBS | DIASTOLIC BLOOD PRESSURE: 72 MMHG | HEART RATE: 91 BPM | HEIGHT: 62 IN | OXYGEN SATURATION: 98 % | BODY MASS INDEX: 34.23 KG/M2

## 2018-09-19 DIAGNOSIS — I38 VALVULAR HEART DISEASE: ICD-10-CM

## 2018-09-19 DIAGNOSIS — E78.2 MIXED HYPERLIPIDEMIA: Primary | ICD-10-CM

## 2018-09-19 DIAGNOSIS — R00.2 PALPITATION: ICD-10-CM

## 2018-09-19 PROCEDURE — 99213 OFFICE O/P EST LOW 20 MIN: CPT | Performed by: INTERNAL MEDICINE

## 2018-09-19 PROCEDURE — 93000 ELECTROCARDIOGRAM COMPLETE: CPT | Performed by: INTERNAL MEDICINE

## 2018-09-19 RX ORDER — ATENOLOL 25 MG/1
25 TABLET ORAL DAILY
Qty: 90 TABLET | Refills: 1 | Status: SHIPPED | OUTPATIENT
Start: 2018-09-19 | End: 2019-10-14 | Stop reason: SDUPTHER

## 2018-09-19 RX ORDER — ATENOLOL 25 MG/1
1 TABLET ORAL
COMMUNITY
End: 2018-09-19 | Stop reason: SDUPTHER

## 2018-09-19 NOTE — PROGRESS NOTES
subjective     Chief Complaint   Patient presents with   • Results     Labs   • Hyperlipidemia     Follow up   • Palpitations     Follow up     History of Present Illness  Patient is 68 years old white female who is here for cardiology follow-up.  Patient has history of hyperlipidemia.  She is taking Lipitor 40 mg daily and is trying to follow diet.  There is no drug side effects.    Patient also has history of palpitations which are very well controlled with atenolol 25 mg daily.    Patient has valvular heart disease with moderate aortic regurgitation and mitral regurgitation.  Clinically there is no evidence of heart failure.  Last stress test on January 9, 2017 was normal with normal ejection fraction.  Echocardiogram at that time showed moderate aortic and moderate mitral regurgitation.  There was also trace tricuspid regurgitation.  No evidence of pulmonary hypertension.  Patient is here for cardiology follow-up    Past Surgical History:   Procedure Laterality Date   • BREAST BIOPSY Right    • COLONOSCOPY W/ BIOPSIES  09/2014    Dr. Valenzuela-benign colonic mucosa w/ no diagnostic histopathologic abnormalities   • HYSTERECTOMY      partial   • TONSILLECTOMY       Family History   Problem Relation Age of Onset   • Heart attack Father    • Stroke Father    • Heart attack Other    • Cancer Mother    • Cancer Sister    • Cancer Sister    • Heart attack Sister    • Hypertension Sister    • Heart block Sister    • Aneurysm Sister    • Breast cancer Neg Hx      Past Medical History:   Diagnosis Date   • History of EKG 01/23/2015    normal except for rate   • Hyperlipidemia    • Hypertension    • Hypothyroidism    • Mild aortic insufficiency    • Obesity      Patient Active Problem List   Diagnosis   • Hypertension , labile*   • Hyperlipidemia*   • Hypothyroidism*   • Obesity*   • Chest pain in adult   • Valvular heart disease ,moderate aortic and mitral regurgitation   • Palpitation   • B12 deficiency       Social  History   Substance Use Topics   • Smoking status: Former Smoker     Types: Cigarettes     Quit date: 11/2012   • Smokeless tobacco: Never Used   • Alcohol use No       Allergies   Allergen Reactions   • Iodinated Diagnostic Agents    • Sulfa Antibiotics        Current Outpatient Prescriptions on File Prior to Visit   Medication Sig   • aspirin 81 MG tablet Take 1 tablet by mouth daily.   • atorvastatin (LIPITOR) 40 MG tablet Take 1 tablet by mouth Daily.   • B Complex Vitamins (VITAMIN B COMPLEX) capsule capsule Take 1 capsule by mouth daily.   • Calcium Carbonate-Vitamin D 600-125 MG-UNIT tablet Take 1 tablet by mouth 2 (two) times a day.   • citalopram (CELEXA) 40 MG tablet Take 40 mg by mouth daily. Take 1/2 to 1 tablet   • Coenzyme Q10 (CO Q-10 PO) Take 1 tablet by mouth daily.   • ferrous sulfate 325 (65 FE) MG tablet Take 325 mg by mouth daily.   • ipratropium-albuterol (DUO-NEB) 0.5-2.5 mg/mL nebulizer Take 3 mL by nebulization Every 4 (Four) Hours As Needed for Wheezing.   • levothyroxine (SYNTHROID, LEVOTHROID) 50 MCG tablet Take 50 mcg by mouth daily.   • metFORMIN (GLUCOPHAGE) 500 MG tablet Take 2 tablets by mouth as needed. Before meals   • Multiple Vitamin (MULTI-VITAMIN PO) Take  by mouth daily.   • Omeprazole-Sodium Bicarbonate (ZEGERID PO) Take  by mouth daily.   • topiramate (TOPAMAX) 100 MG tablet Take 100 mg by mouth 2 (two) times a day.     No current facility-administered medications on file prior to visit.          The following portions of the patient's history were reviewed and updated as appropriate: allergies, current medications, past family history, past medical history, past social history, past surgical history and problem list.    Review of Systems   Constitution: Negative.   HENT: Negative.  Negative for congestion.    Eyes: Negative.    Cardiovascular: Negative.  Negative for chest pain, cyanosis, dyspnea on exertion, irregular heartbeat, leg swelling, near-syncope, orthopnea,  "palpitations, paroxysmal nocturnal dyspnea and syncope.   Respiratory: Negative.  Negative for shortness of breath.    Hematologic/Lymphatic: Negative.    Musculoskeletal: Negative.    Gastrointestinal: Negative.    Neurological: Negative.  Negative for headaches.          Objective:     /72 (BP Location: Left arm, Patient Position: Sitting, Cuff Size: Adult)   Pulse 91   Ht 157.5 cm (62\")   Wt 84.4 kg (186 lb)   SpO2 98%   BMI 34.02 kg/m²   Physical Exam   Constitutional: She appears well-developed and well-nourished. No distress.   HENT:   Head: Normocephalic and atraumatic.   Mouth/Throat: Oropharynx is clear and moist. No oropharyngeal exudate.   Eyes: Pupils are equal, round, and reactive to light. Conjunctivae and EOM are normal. No scleral icterus.   Neck: Normal range of motion. Neck supple. No JVD present. No tracheal deviation present. No thyromegaly present.   Cardiovascular: Normal rate, regular rhythm and intact distal pulses.  PMI is not displaced.  Exam reveals no gallop, no friction rub and no decreased pulses.    Murmur heard.  Pulses:       Carotid pulses are 3+ on the right side, and 3+ on the left side.       Radial pulses are 3+ on the right side, and 3+ on the left side.   Patient has diastolic murmur audible at the base and grade/6 pansystolic murmur audible over the pericardium   Pulmonary/Chest: Effort normal and breath sounds normal. No respiratory distress. She has no wheezes. She has no rales. She exhibits no tenderness.   Abdominal: Soft. Bowel sounds are normal. She exhibits no distension, no abdominal bruit and no mass. There is no splenomegaly or hepatomegaly. There is no tenderness. There is no rebound and no guarding.   Musculoskeletal: Normal range of motion. She exhibits no edema, tenderness or deformity.   Lymphadenopathy:     She has no cervical adenopathy.   Neurological: She is alert. She has normal reflexes. No cranial nerve deficit. She exhibits normal muscle " tone. Coordination normal.   Skin: Skin is warm and dry. No rash noted. She is not diaphoretic. No erythema.   Psychiatric: She has a normal mood and affect. Her behavior is normal. Judgment and thought content normal.         Lab Review          ECG 12 Lead  Date/Time: 9/24/2018 9:31 AM  Performed by: BURKE ZULETA  Authorized by: BURKE ZULETA   Comparison: compared with previous ECG from 8/29/2016  Comparison to previous ECG: Bradycardia is no longer noted  Rhythm: sinus rhythm  Rate: normal  BPM: 68  Conduction: conduction normal  ST Segments: ST segments normal  T Waves: T waves normal  QRS axis: normal  Other: no other findings  Clinical impression: normal ECG               I personally viewed and interpreted the patient's LAB data         Assessment:     1. Mixed hyperlipidemia    2. Valvular heart disease ,moderate aortic and mitral regurgitation    3. Palpitation          Plan:      Patient has hyperlipidemia she is taking Lipitor 40 mg daily lab work reviewed  Cholesterol is 181 and .  Goal of 70 was emphasized.  She will continue current medications.  Healthy diet and weight loss was emphasized.  Patient is overweight.    Palpitations are better controlled.  No change in therapy is needed.  Follow-up scheduled      Return in about 6 months (around 3/19/2019).

## 2019-01-28 ENCOUNTER — HOSPITAL ENCOUNTER (OUTPATIENT)
Dept: BONE DENSITY | Facility: HOSPITAL | Age: 69
Discharge: HOME | End: 2019-01-28
Admitting: NURSE PRACTITIONER

## 2019-01-28 DIAGNOSIS — Z78.0 POSTMENOPAUSAL: ICD-10-CM

## 2019-01-28 PROCEDURE — 77080 DXA BONE DENSITY AXIAL: CPT | Performed by: RADIOLOGY

## 2019-01-28 PROCEDURE — 77080 DXA BONE DENSITY AXIAL: CPT

## 2019-03-19 ENCOUNTER — OFFICE VISIT (OUTPATIENT)
Dept: CARDIOLOGY | Facility: CLINIC | Age: 69
End: 2019-03-19

## 2019-03-19 VITALS
HEART RATE: 82 BPM | DIASTOLIC BLOOD PRESSURE: 80 MMHG | WEIGHT: 180 LBS | OXYGEN SATURATION: 95 % | SYSTOLIC BLOOD PRESSURE: 122 MMHG | HEIGHT: 62 IN | BODY MASS INDEX: 33.13 KG/M2

## 2019-03-19 DIAGNOSIS — I38 VALVULAR HEART DISEASE: ICD-10-CM

## 2019-03-19 DIAGNOSIS — R00.2 PALPITATION: ICD-10-CM

## 2019-03-19 DIAGNOSIS — E78.2 MIXED HYPERLIPIDEMIA: Primary | ICD-10-CM

## 2019-03-19 DIAGNOSIS — I10 ESSENTIAL HYPERTENSION: ICD-10-CM

## 2019-03-19 PROBLEM — E11.9 TYPE 2 DIABETES MELLITUS WITHOUT COMPLICATION, WITHOUT LONG-TERM CURRENT USE OF INSULIN (HCC): Status: ACTIVE | Noted: 2019-03-19

## 2019-03-19 PROCEDURE — 99213 OFFICE O/P EST LOW 20 MIN: CPT | Performed by: INTERNAL MEDICINE

## 2019-03-19 NOTE — PROGRESS NOTES
subjective     Chief Complaint   Patient presents with   • Hyperlipidemia   • Hypertension   • Follow-up     History of Present Illness  Patient is 69 years old white female who has history of hyperlipidemia, labile hypertension and moderate aortic regurgitation.  Patient is here for follow-up.  She seems to be doing very well.  She denies any chest pain palpitations or shortness of breath.  Her lab work had shown LDL of 136.  She is taking Lipitor 40 mg daily according to her.    Past Surgical History:   Procedure Laterality Date   • BREAST BIOPSY Right    • COLONOSCOPY W/ BIOPSIES  2014    Dr. Valenzuela-benign colonic mucosa w/ no diagnostic histopathologic abnormalities   • HYSTERECTOMY      partial   • TONSILLECTOMY       Family History   Problem Relation Age of Onset   • Heart attack Father    • Stroke Father    • Heart attack Other    • Cancer Mother    • Cancer Sister    • Cancer Sister    • Heart attack Sister    • Hypertension Sister    • Heart block Sister    • Aneurysm Sister    • Breast cancer Neg Hx      Past Medical History:   Diagnosis Date   • History of EKG 2015    normal except for rate   • Hyperlipidemia    • Hypertension    • Hypothyroidism    • Mild aortic insufficiency    • Obesity      Patient Active Problem List   Diagnosis   • Hypertension , labile*   • Hyperlipidemia*   • Hypothyroidism*   • Obesity*   • Chest pain in adult   • Valvular heart disease ,moderate aortic and mitral regurgitation   • Palpitation   • B12 deficiency   • Type 2 diabetes mellitus without complication, without long-term current use of insulin (CMS/[facility])       Social History     Tobacco Use   • Smoking status: Former Smoker     Types: Cigarettes     Last attempt to quit: 2012     Years since quittin.3   • Smokeless tobacco: Never Used   Substance Use Topics   • Alcohol use: No   • Drug use: No       Allergies   Allergen Reactions   • Iodinated Diagnostic Agents    • Sulfa Antibiotics        Current  Outpatient Medications on File Prior to Visit   Medication Sig   • aspirin 81 MG tablet Take 1 tablet by mouth daily.   • atenolol (TENORMIN) 25 MG tablet Take 1 tablet by mouth Daily.   • atorvastatin (LIPITOR) 40 MG tablet Take 1 tablet by mouth Daily.   • B Complex Vitamins (VITAMIN B COMPLEX) capsule capsule Take 1 capsule by mouth daily.   • Calcium Carbonate-Vitamin D 600-125 MG-UNIT tablet Take 1 tablet by mouth 2 (two) times a day.   • citalopram (CELEXA) 40 MG tablet Take 40 mg by mouth daily. Take 1/2 to 1 tablet   • Coenzyme Q10 (CO Q-10 PO) Take 1 tablet by mouth daily.   • ferrous sulfate 325 (65 FE) MG tablet Take 325 mg by mouth daily.   • ipratropium-albuterol (DUO-NEB) 0.5-2.5 mg/mL nebulizer Take 3 mL by nebulization Every 4 (Four) Hours As Needed for Wheezing.   • levothyroxine (SYNTHROID, LEVOTHROID) 50 MCG tablet Take 50 mcg by mouth daily.   • metFORMIN (GLUCOPHAGE) 500 MG tablet Take 2 tablets by mouth as needed. Before meals   • Multiple Vitamin (MULTI-VITAMIN PO) Take  by mouth daily.   • Omeprazole-Sodium Bicarbonate (ZEGERID PO) Take  by mouth daily.   • topiramate (TOPAMAX) 100 MG tablet Take 100 mg by mouth 2 (two) times a day.     No current facility-administered medications on file prior to visit.          The following portions of the patient's history were reviewed and updated as appropriate: allergies, current medications, past family history, past medical history, past social history, past surgical history and problem list.    Review of Systems   Constitution: Negative.   HENT: Negative.  Negative for congestion.    Eyes: Negative.    Cardiovascular: Negative.  Negative for chest pain, cyanosis, dyspnea on exertion, irregular heartbeat, leg swelling, near-syncope, orthopnea, palpitations, paroxysmal nocturnal dyspnea and syncope.   Respiratory: Negative.  Negative for shortness of breath.    Hematologic/Lymphatic: Negative.    Musculoskeletal: Negative.    Gastrointestinal:  "Negative.    Neurological: Negative.  Negative for headaches.          Objective:     /80 (BP Location: Left arm, Patient Position: Sitting)   Pulse 82   Ht 157.5 cm (62\")   Wt 81.6 kg (180 lb)   SpO2 95%   BMI 32.92 kg/m²   Physical Exam   Constitutional: She appears well-developed and well-nourished. No distress.   HENT:   Head: Normocephalic and atraumatic.   Mouth/Throat: Oropharynx is clear and moist. No oropharyngeal exudate.   Eyes: Conjunctivae and EOM are normal. Pupils are equal, round, and reactive to light. No scleral icterus.   Neck: Normal range of motion. Neck supple. No JVD present. No tracheal deviation present. No thyromegaly present.   Cardiovascular: Normal rate, regular rhythm, normal heart sounds and intact distal pulses. PMI is not displaced. Exam reveals no gallop, no friction rub and no decreased pulses.   No murmur heard.  Pulses:       Carotid pulses are 3+ on the right side, and 3+ on the left side.       Radial pulses are 3+ on the right side, and 3+ on the left side.   Pulmonary/Chest: Effort normal and breath sounds normal. No respiratory distress. She has no wheezes. She has no rales. She exhibits no tenderness.   Abdominal: Soft. Bowel sounds are normal. She exhibits no distension, no abdominal bruit and no mass. There is no splenomegaly or hepatomegaly. There is no tenderness. There is no rebound and no guarding.   Musculoskeletal: Normal range of motion. She exhibits no edema, tenderness or deformity.   Lymphadenopathy:     She has no cervical adenopathy.   Neurological: She is alert. She has normal reflexes. No cranial nerve deficit. She exhibits normal muscle tone. Coordination normal.   Skin: Skin is warm and dry. No rash noted. She is not diaphoretic. No erythema.   Psychiatric: She has a normal mood and affect. Her behavior is normal. Judgment and thought content normal.         Lab Review    Procedures       I personally viewed and interpreted the patient's LAB " data         Assessment:     1. Mixed hyperlipidemia    2. Essential hypertension    3. Valvular heart disease ,moderate aortic and mitral regurgitation    4. Palpitation          Plan:     Lipids are not very well controlled.  LDL is 136.  Patient will continue Lipitor and will have lab work done.  Healthy lifestyle was again emphasized.  Blood pressure is very well controlled.  No change in therapy.  Patient has moderate aortic regurgitation clinically no evidence of heart failure.        No Follow-up on file.

## 2019-10-14 ENCOUNTER — OFFICE VISIT (OUTPATIENT)
Dept: CARDIOLOGY | Facility: CLINIC | Age: 69
End: 2019-10-14

## 2019-10-14 VITALS
DIASTOLIC BLOOD PRESSURE: 64 MMHG | HEIGHT: 62 IN | BODY MASS INDEX: 32.06 KG/M2 | OXYGEN SATURATION: 97 % | SYSTOLIC BLOOD PRESSURE: 94 MMHG | HEART RATE: 77 BPM | WEIGHT: 174.2 LBS

## 2019-10-14 DIAGNOSIS — I38 VALVULAR HEART DISEASE: ICD-10-CM

## 2019-10-14 DIAGNOSIS — E78.2 MIXED HYPERLIPIDEMIA: Primary | ICD-10-CM

## 2019-10-14 PROCEDURE — 99213 OFFICE O/P EST LOW 20 MIN: CPT | Performed by: INTERNAL MEDICINE

## 2019-10-14 RX ORDER — ATENOLOL 25 MG/1
25 TABLET ORAL DAILY
Qty: 90 TABLET | Refills: 1 | Status: SHIPPED | OUTPATIENT
Start: 2019-10-14

## 2019-10-14 RX ORDER — FUROSEMIDE 40 MG/1
1 TABLET ORAL
COMMUNITY
Start: 2019-08-23

## 2019-10-14 RX ORDER — ATORVASTATIN CALCIUM 40 MG/1
40 TABLET, FILM COATED ORAL NIGHTLY
Qty: 90 TABLET | Refills: 1 | Status: SHIPPED | OUTPATIENT
Start: 2019-10-14

## 2019-10-14 NOTE — PROGRESS NOTES
subjective     Chief Complaint   Patient presents with   • MIxed Hyperlipidemia     follow up     History of Present Illness  Patient is 69 years old white female who is here for cardiology follow-up.  Patient has hyperlipidemia.  She is taking Lipitor 40 mg daily.  No drug side effects.  Patient also has moderate aortic and mitral regurgitation.  She is clinically asymptomatic no signs of heart failure.  Blood pressure is around 118/70.    Past Surgical History:   Procedure Laterality Date   • BREAST BIOPSY Right    • COLONOSCOPY W/ BIOPSIES  2014    Dr. Valenzuela-benign colonic mucosa w/ no diagnostic histopathologic abnormalities   • HYSTERECTOMY      partial   • TONSILLECTOMY       Family History   Problem Relation Age of Onset   • Heart attack Father    • Stroke Father    • Heart attack Other    • Cancer Mother    • Cancer Sister    • Cancer Sister    • Heart attack Sister    • Hypertension Sister    • Heart block Sister    • Aneurysm Sister    • Breast cancer Neg Hx      Past Medical History:   Diagnosis Date   • History of EKG 2015    normal except for rate   • Hyperlipidemia    • Hypertension    • Hypothyroidism    • Mild aortic insufficiency    • Obesity      Patient Active Problem List   Diagnosis   • Hypertension , labile*   • Hyperlipidemia*   • Hypothyroidism*   • Obesity*   • Chest pain in adult   • Valvular heart disease ,moderate aortic and mitral regurgitation   • Palpitation   • B12 deficiency   • Type 2 diabetes mellitus without complication, without long-term current use of insulin (CMS/[facility])       Social History     Tobacco Use   • Smoking status: Former Smoker     Types: Cigarettes     Last attempt to quit: 2012     Years since quittin.9   • Smokeless tobacco: Never Used   Substance Use Topics   • Alcohol use: No   • Drug use: No       Allergies   Allergen Reactions   • Iodinated Diagnostic Agents    • Sulfa Antibiotics        Current Outpatient Medications on File Prior to Visit    Medication Sig   • aspirin 81 MG tablet Take 1 tablet by mouth daily.   • B Complex Vitamins (VITAMIN B COMPLEX) capsule capsule Take 1 capsule by mouth Every Other Day.   • Calcium Carbonate-Vitamin D 600-125 MG-UNIT tablet Take 1 tablet by mouth 2 (two) times a day.   • Coenzyme Q10 (CO Q-10 PO) Take 1 tablet by mouth daily.   • ferrous sulfate 325 (65 FE) MG tablet Take 325 mg by mouth daily.   • furosemide (LASIX) 40 MG tablet Take 1 tablet by mouth.   • ipratropium-albuterol (DUO-NEB) 0.5-2.5 mg/mL nebulizer Take 3 mL by nebulization Every 4 (Four) Hours As Needed for Wheezing.   • levothyroxine sodium (TIROSINT) 75 MCG capsule Take 75 mcg by mouth Daily.   • Multiple Vitamin (MULTI-VITAMIN PO) Take  by mouth daily.   • Omeprazole-Sodium Bicarbonate (ZEGERID PO) Take  by mouth daily.   • Potassium 99 MG tablet Take  by mouth.   • topiramate (TOPAMAX) 100 MG tablet Take 100 mg by mouth 2 (two) times a day.   • [DISCONTINUED] atenolol (TENORMIN) 25 MG tablet Take 1 tablet by mouth Daily.   • [DISCONTINUED] atorvastatin (LIPITOR) 40 MG tablet Take 1 tablet by mouth Daily. (Patient taking differently: Take 40 mg by mouth Every Night.)   • [DISCONTINUED] citalopram (CELEXA) 40 MG tablet Take 40 mg by mouth daily. Take 1/2 to 1 tablet   • [DISCONTINUED] metFORMIN (GLUCOPHAGE) 500 MG tablet Take 2 tablets by mouth as needed. Before meals     No current facility-administered medications on file prior to visit.          The following portions of the patient's history were reviewed and updated as appropriate: allergies, current medications, past family history, past medical history, past social history, past surgical history and problem list.    Review of Systems   Constitution: Positive for malaise/fatigue.   HENT: Negative.  Negative for congestion.    Eyes: Negative.    Cardiovascular: Negative for chest pain, cyanosis, dyspnea on exertion, irregular heartbeat, near-syncope, orthopnea, palpitations, paroxysmal  "nocturnal dyspnea and syncope.   Respiratory: Negative.  Negative for shortness of breath.    Hematologic/Lymphatic: Negative.    Musculoskeletal: Negative.    Gastrointestinal: Negative.    Neurological: Negative.  Negative for headaches.          Objective:     BP 94/64 (BP Location: Left arm, Patient Position: Sitting, Cuff Size: Adult)   Pulse 77   Ht 157.5 cm (62\")   Wt 79 kg (174 lb 3.2 oz)   SpO2 97%   BMI 31.86 kg/m²   Physical Exam   Constitutional: She appears well-developed and well-nourished. No distress.   HENT:   Head: Normocephalic and atraumatic.   Mouth/Throat: Oropharynx is clear and moist. No oropharyngeal exudate.   Eyes: Conjunctivae and EOM are normal. Pupils are equal, round, and reactive to light. No scleral icterus.   Neck: Normal range of motion. Neck supple. No JVD present. No tracheal deviation present. No thyromegaly present.   Cardiovascular: Normal rate, regular rhythm, normal heart sounds and intact distal pulses. PMI is not displaced. Exam reveals no gallop, no friction rub and no decreased pulses.   No murmur heard.  Pulses:       Carotid pulses are 3+ on the right side, and 3+ on the left side.       Radial pulses are 3+ on the right side, and 3+ on the left side.   Pulmonary/Chest: Effort normal and breath sounds normal. No respiratory distress. She has no wheezes. She has no rales. She exhibits no tenderness.   Abdominal: Soft. Bowel sounds are normal. She exhibits no distension, no abdominal bruit and no mass. There is no splenomegaly or hepatomegaly. There is no tenderness. There is no rebound and no guarding.   Musculoskeletal: Normal range of motion. She exhibits no edema, tenderness or deformity.   Lymphadenopathy:     She has no cervical adenopathy.   Neurological: She is alert. She has normal reflexes. No cranial nerve deficit. She exhibits normal muscle tone. Coordination normal.   Skin: Skin is warm and dry. No rash noted. She is not diaphoretic. No erythema. "   Psychiatric: She has a normal mood and affect. Her behavior is normal. Judgment and thought content normal.         Lab Review  No lab work available today.  We will try to get a copy from PCP    Procedures       I personally viewed and interpreted the patient's LAB data         Assessment:     1. Mixed hyperlipidemia    2. Valvular heart disease ,moderate aortic and mitral regurgitation          Plan:     Patient seems to be doing very well she had lab work done however report is not available.  Will try to get lab report from PCP   she will continue current medications.   Heart rate and blood pressure both normal.  No change in therapy was made.  Healthy lifestyle discussed.  Follow-up scheduled        Return in about 3 months (around 1/14/2020).

## 2021-02-22 DIAGNOSIS — Z23 IMMUNIZATION DUE: ICD-10-CM
